# Patient Record
Sex: FEMALE | Race: WHITE
[De-identification: names, ages, dates, MRNs, and addresses within clinical notes are randomized per-mention and may not be internally consistent; named-entity substitution may affect disease eponyms.]

---

## 2018-12-02 ENCOUNTER — HOSPITAL ENCOUNTER (EMERGENCY)
Dept: HOSPITAL 89 - ER | Age: 18
Discharge: HOME | End: 2018-12-02
Payer: COMMERCIAL

## 2018-12-02 VITALS — DIASTOLIC BLOOD PRESSURE: 75 MMHG | SYSTOLIC BLOOD PRESSURE: 117 MMHG

## 2018-12-02 DIAGNOSIS — E86.0: ICD-10-CM

## 2018-12-02 DIAGNOSIS — R42: Primary | ICD-10-CM

## 2018-12-02 LAB — PLATELET COUNT, AUTOMATED: 276 K/UL (ref 150–450)

## 2018-12-02 PROCEDURE — 96374 THER/PROPH/DIAG INJ IV PUSH: CPT

## 2018-12-02 PROCEDURE — 82310 ASSAY OF CALCIUM: CPT

## 2018-12-02 PROCEDURE — 82374 ASSAY BLOOD CARBON DIOXIDE: CPT

## 2018-12-02 PROCEDURE — 84155 ASSAY OF PROTEIN SERUM: CPT

## 2018-12-02 PROCEDURE — 84520 ASSAY OF UREA NITROGEN: CPT

## 2018-12-02 PROCEDURE — 84450 TRANSFERASE (AST) (SGOT): CPT

## 2018-12-02 PROCEDURE — 71046 X-RAY EXAM CHEST 2 VIEWS: CPT

## 2018-12-02 PROCEDURE — 84132 ASSAY OF SERUM POTASSIUM: CPT

## 2018-12-02 PROCEDURE — 82435 ASSAY OF BLOOD CHLORIDE: CPT

## 2018-12-02 PROCEDURE — 85025 COMPLETE CBC W/AUTO DIFF WBC: CPT

## 2018-12-02 PROCEDURE — 84460 ALANINE AMINO (ALT) (SGPT): CPT

## 2018-12-02 PROCEDURE — 93005 ELECTROCARDIOGRAM TRACING: CPT

## 2018-12-02 PROCEDURE — 99284 EMERGENCY DEPT VISIT MOD MDM: CPT

## 2018-12-02 PROCEDURE — 81001 URINALYSIS AUTO W/SCOPE: CPT

## 2018-12-02 PROCEDURE — 84703 CHORIONIC GONADOTROPIN ASSAY: CPT

## 2018-12-02 PROCEDURE — 84075 ASSAY ALKALINE PHOSPHATASE: CPT

## 2018-12-02 PROCEDURE — 82247 BILIRUBIN TOTAL: CPT

## 2018-12-02 PROCEDURE — 82565 ASSAY OF CREATININE: CPT

## 2018-12-02 PROCEDURE — 96361 HYDRATE IV INFUSION ADD-ON: CPT

## 2018-12-02 PROCEDURE — 82040 ASSAY OF SERUM ALBUMIN: CPT

## 2018-12-02 PROCEDURE — 84295 ASSAY OF SERUM SODIUM: CPT

## 2018-12-02 PROCEDURE — 82947 ASSAY GLUCOSE BLOOD QUANT: CPT

## 2018-12-02 NOTE — ER REPORT
History and Physical


Time Seen By MD:  17:33


HPI/ROS


CHIEF COMPLAINT: Dizziness





HISTORY OF PRESENT ILLNESS: This is a 18-year-old female presents to the 


emergency department for dizziness and lightheadedness. Patient states that 


about 2 hours prior to arrival she began to have some lightheadedness, 


dizziness, nausea and mild chest discomfort. On the drive here she became 


somewhat nauseous however that his improved, chest pain is improved as well 


lightheadedness and dizziness improves as well. No visual disturbances. No 


fevers or chills. No rashes. No meningismus.





REVIEW OF SYSTEMS:


Constitutional: No fever, no chills.


Eyes: No discharge.


ENT: No sore throat. 


Cardiovascular: As above.


Respiratory: No cough, no shortness of breath.


Gastrointestinal: As above.


Genitourinary: No hematuria.


Musculoskeletal: No back pain.


Skin: No rashes.


Neurological: No headache.


Allergies:  


Coded Allergies:  


     No Known Drug Allergies (Unverified , 12/2/18)


Home Meds


No Active Prescriptions or Reported Meds


Past Medical/Surgical History


The patient has no significant past medical or surgical history.


Reviewed Nurses Notes:  Yes


Hx Substance Use Disorder:  No


Hx Alcohol Use:  No


Constitutional





Vital Sign - Last 24 Hours








 12/2/18 12/2/18 12/2/18 12/2/18





 17:02 17:07 17:12 17:32


 


Temp  98.1  


 


Pulse ??? 63  57


 


Resp  20  


 


B/P (MAP)  120/88 120/88 (99) 


 


Pulse Ox  96  95


 


O2 Delivery  Room Air  


 


    





 12/2/18 12/2/18 12/2/18 12/2/18





 17:47 17:48 17:53 18:02


 


Pulse   61 67


 


B/P (MAP) 110/73 (85) 95/79 (84) 120/88 (99) 





   110/73 (85) 





   95/79 (84) 


 


Pulse Ox   95 98


 


O2 Delivery   Room Air 





 12/2/18 12/2/18 12/2/18 12/2/18





 18:12 18:15 18:30 18:45


 


B/P (MAP) 106/73 (84) 116/73 (87) 95/59 (71) 107/77 (87)





 12/2/18   





 19:00   


 


B/P (MAP) 117/75 (89)   








Physical Exam


   General Appearance: The patient is alert, has no immediate need for airway 


protection and no signs of toxicity. 


Eyes: Pupils equal and round no pallor or injection.


ENT, Mouth: Mucous membranes are moist.


Respiratory: There are no retractions, lungs are clear to auscultation.


Cardiovascular: Regular rate and rhythm, very faint systolic murmur, no clicks 


or rubs.


Gastrointestinal:  Abdomen is soft and non tender, no masses, bowel sounds ramiro


l.


Neurological: Alert and oriented 4. Moving all extremities. Following all 


commands. No focal neuro deficits.


Skin: Warm and dry, no rashes.


Musculoskeletal:  Neck is supple non tender.


      Extremities are nontender, nonswollen and have full range of motion.





DIFFERENTIAL DIAGNOSIS: After history and physical exam differential diagnosis 


was considered for dizziness including but not limited to peripheral and central


causes of vertigo, orthostatic causes including dehydration, and blood loss.





Medical Decision Making


Data Points


Result Diagram:  


12/2/18 1806 12/2/18 1806





Laboratory





Hematology








Test


 12/2/18


17:08 12/2/18


18:06


 


Urine Color Straw  


 


Urine Clarity Clear  


 


Urine pH


 5.0 pH


(4.8-9.5) 





 


Urine Specific Gravity 1.009  


 


Urine Protein


 Negative mg/dL


(NEGATIVE) 





 


Urine Glucose (UA)


 Negative mg/dL


(NEGATIVE) 





 


Urine Ketones


 Negative mg/dL


(NEGATIVE) 





 


Urine Blood


 Small


(NEGATIVE) 





 


Urine Nitrite


 Negative


(NEGATIVE) 





 


Urine Bilirubin


 Negative


(NEGATIVE) 





 


Urine Urobilinogen


 Negative mg/dL


(0.2-1.9) 





 


Urine Leukocyte Esterase


 Negative


(NEGATIVE) 





 


Urine RBC


 None /HPF


(0-2/HPF) 





 


Urine WBC


 1 /HPF


(0-5/HPF) 





 


Urine Squamous Epithelial


Cells Many /LPF


(</=FEW) 





 


Urine Bacteria


 Negative /HPF


(NONE-FEW) 





 


Urine Mucus


 None /HPF


(NONE-FEW) 





 


Red Blood Count


 


 5.23 M/uL


(4.17-5.56)


 


Mean Corpuscular Volume


 


 88.3 fL


(80.0-96.0)


 


Mean Corpuscular Hemoglobin


 


 30.5 pg


(26.0-33.0)


 


Mean Corpuscular Hemoglobin


Concent 


 34.5 g/dL


(32.0-36.0)


 


Red Cell Distribution Width


 


 12.9 %


(11.5-14.5)


 


Mean Platelet Volume


 


 8.4 fL


(7.2-11.1)


 


Neutrophils (%) (Auto)


 


 54.7 %


(39.4-72.5)


 


Lymphocytes (%) (Auto)


 


 33.3 %


(17.6-49.6)


 


Monocytes (%) (Auto)


 


 10.1 %


(4.1-12.4)


 


Eosinophils (%) (Auto)


 


 1.5 %


(0.4-6.7)


 


Basophils (%) (Auto)


 


 0.4 %


(0.3-1.4)


 


Nucleated RBC Relative Count


(auto) 


 0.1 /100WBC 





 


Neutrophils # (Auto)


 


 5.5 K/uL


(2.0-7.4)


 


Lymphocytes # (Auto)


 


 3.4 K/uL


(1.3-3.6)


 


Monocytes # (Auto)


 


 1.0 K/uL


(0.3-1.0)


 


Eosinophils # (Auto)


 


 0.2 K/uL


(0.0-0.5)


 


Basophils # (Auto)


 


 0.0 K/uL


(0.0-0.1)


 


Nucleated RBC Absolute Count


(auto) 


 0.01 K/uL 





 


Sodium Level


 


 140 mmol/L


(137-145)


 


Potassium Level


 


 3.7 mmol/L


(3.5-5.0)


 


Chloride Level


 


 104 mmol/L


()


 


Carbon Dioxide Level


 


 24 mmol/L


(22-31)


 


Blood Urea Nitrogen


 


 13 mg/dl


(7-18)


 


Creatinine


 


 0.70 mg/dl


(0.52-1.04)


 


Glomerular Filtration Rate


Calc 


 > 60.0 





 


Random Glucose


 


 91 mg/dl


()


 


Calcium Level


 


 9.7 mg/dl


(8.4-10.2)


 


Total Bilirubin


 


 0.4 mg/dl


(0.2-1.3)


 


Aspartate Amino Transf


(AST/SGOT) 


 26 U/L (0-35) 





 


Alanine Aminotransferase


(ALT/SGPT) 


 26 U/L (0-56) 





 


Alkaline Phosphatase  57 U/L (0-126) 


 


Total Protein


 


 8.5 g/dl


(6.3-8.2)


 


Albumin


 


 4.3 g/dl


(3.5-5.0)


 


Human Chorionic Gonadotropin,


Qual 


 Negative


(NEGATIVE)








Chemistry








Test


 12/2/18


17:08 12/2/18


18:06


 


Urine Color Straw  


 


Urine Clarity Clear  


 


Urine pH


 5.0 pH


(4.8-9.5) 





 


Urine Specific Gravity 1.009  


 


Urine Protein


 Negative mg/dL


(NEGATIVE) 





 


Urine Glucose (UA)


 Negative mg/dL


(NEGATIVE) 





 


Urine Ketones


 Negative mg/dL


(NEGATIVE) 





 


Urine Blood


 Small


(NEGATIVE) 





 


Urine Nitrite


 Negative


(NEGATIVE) 





 


Urine Bilirubin


 Negative


(NEGATIVE) 





 


Urine Urobilinogen


 Negative mg/dL


(0.2-1.9) 





 


Urine Leukocyte Esterase


 Negative


(NEGATIVE) 





 


Urine RBC


 None /HPF


(0-2/HPF) 





 


Urine WBC


 1 /HPF


(0-5/HPF) 





 


Urine Squamous Epithelial


Cells Many /LPF


(</=FEW) 





 


Urine Bacteria


 Negative /HPF


(NONE-FEW) 





 


Urine Mucus


 None /HPF


(NONE-FEW) 





 


White Blood Count


 


 10.1 k/uL


(4.5-11.0)


 


Red Blood Count


 


 5.23 M/uL


(4.17-5.56)


 


Hemoglobin


 


 15.9 g/dL


(12.0-16.0)


 


Hematocrit


 


 46.2 %


(34.0-47.0)


 


Mean Corpuscular Volume


 


 88.3 fL


(80.0-96.0)


 


Mean Corpuscular Hemoglobin


 


 30.5 pg


(26.0-33.0)


 


Mean Corpuscular Hemoglobin


Concent 


 34.5 g/dL


(32.0-36.0)


 


Red Cell Distribution Width


 


 12.9 %


(11.5-14.5)


 


Platelet Count


 


 276 K/uL


(150-450)


 


Mean Platelet Volume


 


 8.4 fL


(7.2-11.1)


 


Neutrophils (%) (Auto)


 


 54.7 %


(39.4-72.5)


 


Lymphocytes (%) (Auto)


 


 33.3 %


(17.6-49.6)


 


Monocytes (%) (Auto)


 


 10.1 %


(4.1-12.4)


 


Eosinophils (%) (Auto)


 


 1.5 %


(0.4-6.7)


 


Basophils (%) (Auto)


 


 0.4 %


(0.3-1.4)


 


Nucleated RBC Relative Count


(auto) 


 0.1 /100WBC 





 


Neutrophils # (Auto)


 


 5.5 K/uL


(2.0-7.4)


 


Lymphocytes # (Auto)


 


 3.4 K/uL


(1.3-3.6)


 


Monocytes # (Auto)


 


 1.0 K/uL


(0.3-1.0)


 


Eosinophils # (Auto)


 


 0.2 K/uL


(0.0-0.5)


 


Basophils # (Auto)


 


 0.0 K/uL


(0.0-0.1)


 


Nucleated RBC Absolute Count


(auto) 


 0.01 K/uL 





 


Glomerular Filtration Rate


Calc 


 > 60.0 





 


Calcium Level


 


 9.7 mg/dl


(8.4-10.2)


 


Total Bilirubin


 


 0.4 mg/dl


(0.2-1.3)


 


Aspartate Amino Transf


(AST/SGOT) 


 26 U/L (0-35) 





 


Alanine Aminotransferase


(ALT/SGPT) 


 26 U/L (0-56) 





 


Alkaline Phosphatase  57 U/L (0-126) 


 


Total Protein


 


 8.5 g/dl


(6.3-8.2)


 


Albumin


 


 4.3 g/dl


(3.5-5.0)


 


Human Chorionic Gonadotropin,


Qual 


 Negative


(NEGATIVE)








Urinalysis








Test


 12/2/18


17:08


 


Urine Color Straw 


 


Urine Clarity Clear 


 


Urine pH


 5.0 pH


(4.8-9.5)


 


Urine Specific Gravity 1.009 


 


Urine Protein


 Negative mg/dL


(NEGATIVE)


 


Urine Glucose (UA)


 Negative mg/dL


(NEGATIVE)


 


Urine Ketones


 Negative mg/dL


(NEGATIVE)


 


Urine Blood


 Small


(NEGATIVE)


 


Urine Nitrite


 Negative


(NEGATIVE)


 


Urine Bilirubin


 Negative


(NEGATIVE)


 


Urine Urobilinogen


 Negative mg/dL


(0.2-1.9)


 


Urine Leukocyte Esterase


 Negative


(NEGATIVE)


 


Urine RBC


 None /HPF


(0-2/HPF)


 


Urine WBC


 1 /HPF


(0-5/HPF)


 


Urine Squamous Epithelial


Cells Many /LPF


(</=FEW)


 


Urine Bacteria


 Negative /HPF


(NONE-FEW)


 


Urine Mucus


 None /HPF


(NONE-FEW)











EKG/Imaging


EKG Interpretation


12 lead EKG: Time of EKG 1759.


      Rhythm: Normal sinus rhythm, ventricular rate 62 bpm.


      Axis: normal 


      QRS: normal


      ST segments: No ST depression or elevation identified.


No previous EKGs for comparison.


Imaging


HISTORY:  Lightheaded, dizzy, mild chest pain


 


COMPARISON:  None.


 


FINDINGS:


The lungs are clear.  No focal consolidation or pleural fluid.  No pneumothorax.


 Normal cardiomediastinal silhouette, with normal heart size and pulmonary 


vascularity.  Visualized osseous structures are unremarkable.


 


IMPRESSION:


Negative chest.


 


Report Dictated By: Sourav Valladares MD at 12/2/2018 6:40 PM


 


Report E-Signed By: Sourav Valladares MD  at 12/2/2018 6:45 PM


 


WSN:[id number]





ED Course/Re-evaluation


Clinical Indication for ER IV:  Hydration, IV Access


ED Course


The patient was admitted to room. Extremities were obtained. Differential 


diagnoses were considered. IV was started. A 1 L normal saline bolus was given. 


A CBC, CMP. 4 mg IV Zofran were given. Laboratory studies unremarkable. Patient 


states feeling significantly better after the saline bolus and the Zofran. Two-v


iew chest x-ray was negative for any acute cardiopulmonary process. EKG showing 


normal sinus rhythm. I did review the results with the patient. I do feel that 


the symptoms that the patient is experiencing today are secondary to lack of 


fluid intake as well as lack of food since this morning. The patient expressed 


understanding. I did recommend following up with a primary care provider for 


reevaluation. The patient was discharged home.


Decision to Disposition Date:  Dec 2, 2018


Decision to Disposition Time:  18:57





Depart


Departure


Latest Vital Signs





Vital Signs








  Date Time  Temp Pulse Resp B/P (MAP) Pulse Ox O2 Delivery O2 Flow Rate FiO2


 


12/2/18 19:00    117/75 (89)    


 


12/2/18 18:02  67   98   


 


12/2/18 17:53      Room Air  


 


12/2/18 17:07 98.1  20     








Impression:  


   Primary Impression:  


   Dizziness


   Additional Impression:  


   Dehydration


Condition:  Improved


Disposition:  HOME OR SELF-CARE


New Scripts


No Active Prescriptions or Reported Meds


Patient Instructions:  Dehydration (ED), Dizziness (ED)





Additional Instructions:  


There are no concerning findings on your EKG.


Your blood work and chest xray do not show anything concerning today.


Be sure to drink plenty of fluids.


Get plenty of rest.


Return to the ED for any other concerns or worsening symptoms.





Problem Qualifiers











ROSMERY SOSA FNP-BC       Dec 2, 2018 17:32

## 2018-12-02 NOTE — EKG
FACILITY: [facility] [place] 

 

PATIENT NAME: ROLAND MARES

: [id number]

MR: [id number]

V: [id number]

EXAM DATE: 

ORDERING PHYSICIAN: ROSMERY SOSA

TECHNOLOGIST: 

 

Test Reason : lightheaded and dizzy

Blood Pressure : ***/*** mmHG

Vent. Rate : 062 BPM     Atrial Rate : 062 BPM

   P-R Int : 174 ms          QRS Dur : 074 ms

    QT Int : 426 ms       P-R-T Axes : 063 058 046 degrees

   QTc Int : 432 ms

 

Normal sinus rhythm

Normal ECG

No previous ECGs available

Confirmed by Adolfo Butler ([id number]) on 2018 11:00:44 PM

 

Referred By:             Confirmed By:Adolfo Calderon

## 2018-12-02 NOTE — RADIOLOGY IMAGING REPORT
[place] [facility] [place] 

 

PATIENT NAME: Lisa Tejada

: 2000

MR: [id number]

V: [id number]

EXAM DATE: 

ORDERING PHYSICIAN: ROSMERY SOSA

TECHNOLOGIST: 

 

[place] [facility] [place]: Lisa Te[place]ada

: 2000

MRN: [id number]

Visit/Account:[id number]

Date of Sevice: 2018

 

ACCESSION #: [id number]

 

EXAMINATION: Chest 2 Views

 

HISTORY:  Lightheaded, dizzy, mild chest pain

 

COMPARISON:  None.

 

FINDINGS:

The lungs are clear.  No focal consolidation or pleural fluid.  No pneumothorax.  Normal cardiomedias
tinal silhouette, with normal heart size and pulmonary vascularity.  Visualized osseous structures ar
e unremarkable.

 

IMPRESSION:

Negative chest.

 

Report Dictated By: Sourav Valladares MD at 2018 6:40 PM

 

Report E-Signed By: Sourav Valladares MD  at 2018 6:45 PM

 

WSN:[id number]